# Patient Record
Sex: MALE | Race: WHITE | NOT HISPANIC OR LATINO | Employment: PART TIME | URBAN - METROPOLITAN AREA
[De-identification: names, ages, dates, MRNs, and addresses within clinical notes are randomized per-mention and may not be internally consistent; named-entity substitution may affect disease eponyms.]

---

## 2022-04-06 ENCOUNTER — OFFICE VISIT (OUTPATIENT)
Dept: FAMILY MEDICINE | Facility: CLINIC | Age: 27
End: 2022-04-06
Payer: COMMERCIAL

## 2022-04-06 VITALS
WEIGHT: 191.1 LBS | RESPIRATION RATE: 18 BRPM | HEIGHT: 71 IN | SYSTOLIC BLOOD PRESSURE: 121 MMHG | BODY MASS INDEX: 26.75 KG/M2 | HEART RATE: 78 BPM | DIASTOLIC BLOOD PRESSURE: 78 MMHG | OXYGEN SATURATION: 96 % | TEMPERATURE: 98.9 F

## 2022-04-06 DIAGNOSIS — G89.29 CHRONIC NONINTRACTABLE HEADACHE, UNSPECIFIED HEADACHE TYPE: Primary | ICD-10-CM

## 2022-04-06 DIAGNOSIS — R51.9 CHRONIC NONINTRACTABLE HEADACHE, UNSPECIFIED HEADACHE TYPE: Primary | ICD-10-CM

## 2022-04-06 DIAGNOSIS — J30.2 SEASONAL ALLERGIC RHINITIS, UNSPECIFIED TRIGGER: ICD-10-CM

## 2022-04-06 PROBLEM — Z11.4 SCREENING FOR HIV (HUMAN IMMUNODEFICIENCY VIRUS): Status: ACTIVE | Noted: 2022-04-06

## 2022-04-06 PROCEDURE — 99203 OFFICE O/P NEW LOW 30 MIN: CPT | Performed by: INTERNAL MEDICINE

## 2022-04-06 RX ORDER — CETIRIZINE HYDROCHLORIDE 10 MG/1
10 TABLET ORAL DAILY
Qty: 60 TABLET | Refills: 1 | Status: SHIPPED | OUTPATIENT
Start: 2022-04-06

## 2022-04-06 ASSESSMENT — PAIN SCALES - GENERAL: PAINLEVEL: MILD PAIN (2)

## 2022-04-06 NOTE — PROGRESS NOTES
"  Assessment & Plan     Chronic nonintractable headache, unspecified headache type  Headaches could be related to sinus pressure or congestion.  Discussed with patient to take Zyrtec daily.  Refer to neurology for headache evaluation.  Cranial nerve exam normal.  Motor or sensory exam normal.  Coordination and gait normal.  This does not sound like migraine or cluster headache.  - Adult Neurology  Referral; Future    Seasonal allergic rhinitis, unspecified trigger  - cetirizine (ZYRTEC) 10 MG tablet; Take 1 tablet (10 mg) by mouth daily     BMI:   Estimated body mass index is 26.65 kg/m  as calculated from the following:    Height as of this encounter: 1.803 m (5' 11\").    Weight as of this encounter: 86.7 kg (191 lb 1.6 oz).   Weight management plan: Patient was referred to their PCP to discuss a diet and exercise plan.    See Patient Instructions    Return if symptoms worsen or fail to improve.    YOSELYN JOVEL MD  [facility] WESTLEY Montaño is a 26 year old who presents for the following health issues     Patient presents with complaints of headache for 2 months.  Headache is frontal and 1-2 on 10 in intensity.  Patient took doxycycline and pseudoephedrine for suspected sinus infection and during that time his headache was improved.  He developed nausea after stopping antibiotics.  He does get tension headaches sometimes which is improved with ibuprofen.  He was not able to sleep well after taking pseudoephedrine.  He is reporting headaches daily.  He does have mild allergies.  He cannot identify any triggers.    History of Present Illness       Migraines:   Since the patient's last clinic visit, headaches are: worsened  The patient is getting headaches:  Daily  He is able to do normal daily activities when he has a migraine.  The patient is taking the following rescue/relief medications:  Ibuprofen (Advil, Motrin)   Patient states \"I get some relief\" from the rescue/relief " "medications.   The patient is taking the following medications to prevent migraines:  No medications to prevent migraines  In the past 4 weeks, the patient has gone to an Urgent Care or Emergency Room 1 time times due to headaches.    He eats 2-3 servings of fruits and vegetables daily.He consumes 1 sweetened beverage(s) daily.He exercises with enough effort to increase his heart rate 9 or less minutes per day.  He exercises with enough effort to increase his heart rate 3 or less days per week.   He is taking medications regularly.       Review of Systems       Objective    /78 (BP Location: Right arm, Cuff Size: Adult Regular)   Pulse 78   Temp 98.9  F (37.2  C) (Tympanic)   Resp 18   Ht 1.803 m (5' 11\")   Wt 86.7 kg (191 lb 1.6 oz)   SpO2 96%   BMI 26.65 kg/m    Body mass index is 26.65 kg/m .  Physical Exam  Vitals reviewed.   Neurological:      General: No focal deficit present.      Mental Status: He is alert and oriented to person, place, and time. Mental status is at baseline.      Cranial Nerves: Cranial nerves are intact.      Sensory: Sensation is intact.      Motor: Motor function is intact.      Coordination: Coordination is intact.      Gait: Gait is intact.              "

## 2022-04-17 ENCOUNTER — HEALTH MAINTENANCE LETTER (OUTPATIENT)
Age: 27
End: 2022-04-17

## 2022-04-21 ENCOUNTER — HOSPITAL ENCOUNTER (OUTPATIENT)
Dept: MRI IMAGING | Facility: CLINIC | Age: 27
Discharge: HOME | End: 2022-04-21
Attending: INTERNAL MEDICINE | Admitting: INTERNAL MEDICINE
Payer: COMMERCIAL

## 2022-04-21 DIAGNOSIS — G89.29 CHRONIC NONINTRACTABLE HEADACHE, UNSPECIFIED HEADACHE TYPE: ICD-10-CM

## 2022-04-21 DIAGNOSIS — R51.9 CHRONIC NONINTRACTABLE HEADACHE, UNSPECIFIED HEADACHE TYPE: ICD-10-CM

## 2022-04-21 PROCEDURE — 70551 MRI BRAIN STEM W/O DYE: CPT

## 2022-04-25 ENCOUNTER — TELEPHONE (OUTPATIENT)
Dept: FAMILY MEDICINE | Facility: CLINIC | Age: 27
End: 2022-04-25
Payer: COMMERCIAL

## 2022-04-25 DIAGNOSIS — J33.8: Primary | ICD-10-CM

## 2022-05-04 ENCOUNTER — MYC MEDICAL ADVICE (OUTPATIENT)
Dept: FAMILY MEDICINE | Facility: CLINIC | Age: 27
End: 2022-05-04
Payer: COMMERCIAL

## 2022-05-04 DIAGNOSIS — J33.8 MAXILLARY SINUS POLYP: Primary | ICD-10-CM

## 2022-05-05 NOTE — TELEPHONE ENCOUNTER
Dr. Cordero, please read the my chart message.     ENT referral order  pended.     Francine Umaña RN  [facility] [[email or web address]]

## 2022-05-06 NOTE — TELEPHONE ENCOUNTER
FUTURE VISIT INFORMATION      FUTURE VISIT INFORMATION:    Date: 5/9/22    Time: 1:30PM    Location: [facility]  REFERRAL INFORMATION:    Referring provider:  Pamela Cordero MD    Referring providers clinic:  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  5/4/22 referral   4/6/22 note from Pamela Cordero MD EPIC    Imaging 4/21/22 MR brain  Epic PACS

## 2022-05-09 ENCOUNTER — PRE VISIT (OUTPATIENT)
Dept: OTOLARYNGOLOGY | Facility: CLINIC | Age: 27
End: 2022-05-09

## 2022-05-09 ENCOUNTER — OFFICE VISIT (OUTPATIENT)
Dept: OTOLARYNGOLOGY | Facility: CLINIC | Age: 27
End: 2022-05-09
Attending: INTERNAL MEDICINE
Payer: COMMERCIAL

## 2022-05-09 VITALS
SYSTOLIC BLOOD PRESSURE: 127 MMHG | BODY MASS INDEX: 25.9 KG/M2 | HEIGHT: 71 IN | TEMPERATURE: 98.6 F | DIASTOLIC BLOOD PRESSURE: 75 MMHG | WEIGHT: 185 LBS | OXYGEN SATURATION: 96 % | HEART RATE: 74 BPM

## 2022-05-09 DIAGNOSIS — J33.8 MAXILLARY SINUS POLYP: ICD-10-CM

## 2022-05-09 DIAGNOSIS — R11.0 CHRONIC NAUSEA: Primary | ICD-10-CM

## 2022-05-09 PROCEDURE — 99203 OFFICE O/P NEW LOW 30 MIN: CPT | Performed by: OTOLARYNGOLOGY

## 2022-05-09 RX ORDER — ONDANSETRON 4 MG/1
4 TABLET, ORALLY DISINTEGRATING ORAL EVERY 6 HOURS PRN
Qty: 25 TABLET | Refills: 0 | Status: SHIPPED | OUTPATIENT
Start: 2022-05-09

## 2022-05-09 ASSESSMENT — PAIN SCALES - GENERAL: PAINLEVEL: NO PAIN (0)

## 2022-05-09 NOTE — LETTER
5/9/2022       RE: Danyel Torres  [address] [id number]     Dear Colleague,    Thank you for referring your patient, Danyel Torres, to the [facility] [place] at [facility]. Please see a copy of my visit note below.    HISTORY OF PRESENT ILLNESS:  The patient is here to see us today for the first time.  He has had problems with headache that had for some time been very difficult for him to tolerate.  He also has had GI issues with inability to digest food well at times as well as some chronic nausea.  The headache symptoms led him to get an MRI, which demonstrated a polyp in his left maxillary sinus.  He has no substantial symptoms of nasal congestion.  He is able to breathe well generally through his nose.  He does not have chronic abnormal nasal drainage.  His headaches have gotten better as well.    PAST MEDICAL HISTORY: Noted and reviewed in the chart.     FAMILY HISTORY: Noted and reviewed in the chart.     PAST SURGICAL HISTORY: Noted and reviewed in the chart.     SOCIAL HISTORY: Noted and reviewed in the chart.     PHYSICAL EXAMINATION:  On examination, this is a 26-year-old gentleman in no acute distress.  Normal mood and affect, normal ability to communicate.  Alert and appropriate.  Head is normocephalic.  Cranial nerve VII is House-Brackmann I/VI bilaterally.  Breathing without difficulty or stridor.  Eyes are anicteric.  Skin of the head and neck appears normal.    Examination of the mouth shows normal tongue, buccal mucosa and oropharynx.  Examination of the ears show normal external auditory canals and tympanic membranes.  Anterior nasal exam is unremarkable.    IMAGING:  We reviewed the MRI scan with the patient and it does show a well-appearing left maxillary sinus polyp or cyst, which is nonobstructive.    ASSESSMENT:  A 26-year-old with a benign left maxillary sinus polyp.  No need  for intervention.  Not likely to be causing any symptoms.    PLAN:  At this point, recommend observation.  I have referred him to GI for his gastrointestinal issues, have given him a trial of some Zofran and he will come back and see us as needed in the future.      Again, thank you for allowing me to participate in the care of your patient.      Sincerely,    Toi Medina MD

## 2022-05-09 NOTE — NURSING NOTE
"Chief Complaint   Patient presents with     Consult     Maxillary sinus polyp    Blood pressure 127/75, pulse 74, temperature 98.6  F (37  C), temperature source Temporal, height 1.803 m (5' 11\"), weight 83.9 kg (185 lb), SpO2 96 %. Amisha Chun, EMT  "

## 2022-05-09 NOTE — PATIENT INSTRUCTIONS
"You were seen in the clinic today by Dr. Medina. If you have any questions or concerns after your appointment, please call the clinic at [phone]. Press \"1\" for scheduling, press \"3\" for nurse advice.    2.   The following has been recommended for you based upon your appointment today:   - We sent a script for Zofran to your pharmacy. Please take as instructed.   - I have placed a referral for GI in your chart. They should reach out to you for scheduling.        Staci Hamm RNCC  [facility]  Department of Otolaryngology  [phone]   "

## 2022-05-09 NOTE — PROGRESS NOTES
HISTORY OF PRESENT ILLNESS:  The patient is here to see us today for the first time.  He has had problems with headache that had for some time been very difficult for him to tolerate.  He also has had GI issues with inability to digest food well at times as well as some chronic nausea.  The headache symptoms led him to get an MRI, which demonstrated a polyp in his left maxillary sinus.  He has no substantial symptoms of nasal congestion.  He is able to breathe well generally through his nose.  He does not have chronic abnormal nasal drainage.  His headaches have gotten better as well.    PAST MEDICAL HISTORY: Noted and reviewed in the chart.     FAMILY HISTORY: Noted and reviewed in the chart.     PAST SURGICAL HISTORY: Noted and reviewed in the chart.     SOCIAL HISTORY: Noted and reviewed in the chart.     PHYSICAL EXAMINATION:  On examination, this is a 26-year-old gentleman in no acute distress.  Normal mood and affect, normal ability to communicate.  Alert and appropriate.  Head is normocephalic.  Cranial nerve VII is House-Brackmann I/VI bilaterally.  Breathing without difficulty or stridor.  Eyes are anicteric.  Skin of the head and neck appears normal.    Examination of the mouth shows normal tongue, buccal mucosa and oropharynx.  Examination of the ears show normal external auditory canals and tympanic membranes.  Anterior nasal exam is unremarkable.    IMAGING:  We reviewed the MRI scan with the patient and it does show a well-appearing left maxillary sinus polyp or cyst, which is nonobstructive.    ASSESSMENT:  A 26-year-old with a benign left maxillary sinus polyp.  No need for intervention.  Not likely to be causing any symptoms.    PLAN:  At this point, recommend observation.  I have referred him to GI for his gastrointestinal issues, have given him a trial of some Zofran and he will come back and see us as needed in the future.

## 2022-08-01 ENCOUNTER — TELEPHONE (OUTPATIENT)
Dept: GASTROENTEROLOGY | Facility: CLINIC | Age: 27
End: 2022-08-01

## 2022-08-11 ENCOUNTER — TELEPHONE (OUTPATIENT)
Dept: GASTROENTEROLOGY | Facility: CLINIC | Age: 27
End: 2022-08-11

## 2022-08-11 NOTE — TELEPHONE ENCOUNTER
fullx2 will sent mychartx2 , per RN schedule new pt as next available with any general GI provider.

## 2022-10-23 ENCOUNTER — HEALTH MAINTENANCE LETTER (OUTPATIENT)
Age: 27
End: 2022-10-23

## 2022-12-10 ENCOUNTER — HEALTH MAINTENANCE LETTER (OUTPATIENT)
Age: 27
End: 2022-12-10

## 2023-04-24 ENCOUNTER — OFFICE VISIT (OUTPATIENT)
Dept: URGENT CARE | Facility: URGENT CARE | Age: 28
End: 2023-04-24
Payer: COMMERCIAL

## 2023-04-24 VITALS
BODY MASS INDEX: 24.83 KG/M2 | SYSTOLIC BLOOD PRESSURE: 143 MMHG | TEMPERATURE: 98.5 F | OXYGEN SATURATION: 97 % | DIASTOLIC BLOOD PRESSURE: 83 MMHG | WEIGHT: 178 LBS | HEART RATE: 98 BPM

## 2023-04-24 DIAGNOSIS — R19.5 CHANGE IN STOOL: Primary | ICD-10-CM

## 2023-04-24 PROCEDURE — 99207 PR NO CHARGE LOS: CPT

## 2023-04-24 NOTE — PROGRESS NOTES
Assessment & Plan     Change in stool    Recommend connecting with PCP for further workup in primary care clinic with referral to GASTRO per PCP discretion.  Was initially very frustrated this could not be done in UC and that I was not taking his concern seriously enough until further conversation resulted in a mutual understanding that this concern is to be followed up by PCP and GI.  No charge for UC visit.    Nohemy Coyne MD   Urgent Care Provider  [facility] WESTLEY Montaño is a 27 year old, presenting for the following health issues:  Bowel Problems (Stool was very thin this morning, constipation or diarrhea on and off x 1 year- has Anxiety at times, no blood in stool )         View : No data to display.              HPI     Presents today worried about passing a very thin stool.  Notes alternating constipation and/or diarrhea x 1 year.    States that almost every time he is about to fall asleep he jerks awake- and feels this is related to his GI sx.  Denies any blood in stool or unexplained weight loss.  No N/V.    Patient feels anxious wanting to get a comprehensive GI workup and be worked up for cancer/IBD/IBS and other neurological workup prn.    Review of Systems   Constitutional, HEENT, cardiovascular, pulmonary, GI, , musculoskeletal, neuro, skin, endocrine and psych systems are negative, except as otherwise noted.      Objective    BP (!) 143/83 (BP Location: Left arm, Patient Position: Sitting, Cuff Size: Adult Large)   Pulse 98   Temp 98.5  F (36.9  C) (Tympanic)   Wt 80.7 kg (178 lb)   SpO2 97%   BMI 24.83 kg/m    Body mass index is 24.83 kg/m .  Physical Exam   GENERAL: healthy, alert and no distress  EYES: Eyes grossly normal to inspection, PERRL and conjunctivae and sclerae normal  PSYCH: mentation appears normal, affect normal/bright, anxious

## 2023-08-27 ENCOUNTER — MYC MEDICAL ADVICE (OUTPATIENT)
Dept: FAMILY MEDICINE | Facility: CLINIC | Age: 28
End: 2023-08-27
Payer: COMMERCIAL

## 2023-08-28 NOTE — TELEPHONE ENCOUNTER
"See MY CHART -- pt request referral GI due to daily nausea, inconsistent bowel movements. Also mentions anxiety & chest pain & Neuro issues.     The GI referral placed 2022 by ENT      Not sure what the \"Adult Call Center\" is but on 23 a referral was placed     Ordering Provider's NPI: None  SELF, REFERRED  Adult GI  Referral - Consult Only [[id number]]  Electronically signed by: Sania Kulkarni on 23 Status: Active   Ordering user: Sania Kulkarni 23         Associated Diagnoses  Diagnosis unknown [R69]  - Primary        The 1 office visit with Dr. Cordero 2022 was for HA's, did not address GI issues.    Appt or refer?      Staci LYMAN MA     "

## 2024-01-14 ENCOUNTER — HEALTH MAINTENANCE LETTER (OUTPATIENT)
Age: 29
End: 2024-01-14

## 2025-01-26 ENCOUNTER — HEALTH MAINTENANCE LETTER (OUTPATIENT)
Age: 30
End: 2025-01-26